# Patient Record
Sex: MALE | Race: WHITE
[De-identification: names, ages, dates, MRNs, and addresses within clinical notes are randomized per-mention and may not be internally consistent; named-entity substitution may affect disease eponyms.]

---

## 2022-09-26 ENCOUNTER — HOSPITAL ENCOUNTER (OUTPATIENT)
Dept: HOSPITAL 19 - MEDICAL | Age: 72
LOS: 1 days | Discharge: HOME | End: 2022-09-27
Attending: INTERNAL MEDICINE
Payer: MEDICARE

## 2022-09-26 VITALS — SYSTOLIC BLOOD PRESSURE: 145 MMHG | DIASTOLIC BLOOD PRESSURE: 85 MMHG | HEART RATE: 86 BPM

## 2022-09-26 VITALS — WEIGHT: 202.16 LBS | HEIGHT: 69.02 IN | BODY MASS INDEX: 29.94 KG/M2

## 2022-09-26 VITALS — DIASTOLIC BLOOD PRESSURE: 92 MMHG | TEMPERATURE: 98.5 F | SYSTOLIC BLOOD PRESSURE: 174 MMHG | HEART RATE: 80 BPM

## 2022-09-26 VITALS — SYSTOLIC BLOOD PRESSURE: 146 MMHG | HEART RATE: 91 BPM | DIASTOLIC BLOOD PRESSURE: 83 MMHG

## 2022-09-26 VITALS — DIASTOLIC BLOOD PRESSURE: 72 MMHG | SYSTOLIC BLOOD PRESSURE: 144 MMHG | HEART RATE: 74 BPM

## 2022-09-26 VITALS — SYSTOLIC BLOOD PRESSURE: 141 MMHG | DIASTOLIC BLOOD PRESSURE: 99 MMHG | HEART RATE: 89 BPM

## 2022-09-26 VITALS — SYSTOLIC BLOOD PRESSURE: 151 MMHG | HEART RATE: 82 BPM | DIASTOLIC BLOOD PRESSURE: 83 MMHG

## 2022-09-26 VITALS — DIASTOLIC BLOOD PRESSURE: 74 MMHG | TEMPERATURE: 98.5 F | HEART RATE: 87 BPM | SYSTOLIC BLOOD PRESSURE: 135 MMHG

## 2022-09-26 VITALS — SYSTOLIC BLOOD PRESSURE: 155 MMHG | HEART RATE: 85 BPM | DIASTOLIC BLOOD PRESSURE: 70 MMHG

## 2022-09-26 VITALS — DIASTOLIC BLOOD PRESSURE: 80 MMHG | HEART RATE: 86 BPM | SYSTOLIC BLOOD PRESSURE: 115 MMHG

## 2022-09-26 VITALS — TEMPERATURE: 97.8 F | DIASTOLIC BLOOD PRESSURE: 88 MMHG | HEART RATE: 83 BPM | SYSTOLIC BLOOD PRESSURE: 158 MMHG

## 2022-09-26 VITALS — HEART RATE: 89 BPM | DIASTOLIC BLOOD PRESSURE: 77 MMHG | SYSTOLIC BLOOD PRESSURE: 158 MMHG

## 2022-09-26 VITALS — HEART RATE: 88 BPM | SYSTOLIC BLOOD PRESSURE: 168 MMHG | DIASTOLIC BLOOD PRESSURE: 83 MMHG

## 2022-09-26 VITALS — HEART RATE: 87 BPM | SYSTOLIC BLOOD PRESSURE: 168 MMHG | DIASTOLIC BLOOD PRESSURE: 97 MMHG

## 2022-09-26 VITALS — DIASTOLIC BLOOD PRESSURE: 88 MMHG | SYSTOLIC BLOOD PRESSURE: 140 MMHG | HEART RATE: 93 BPM

## 2022-09-26 DIAGNOSIS — Z79.82: ICD-10-CM

## 2022-09-26 DIAGNOSIS — I73.9: ICD-10-CM

## 2022-09-26 DIAGNOSIS — I25.810: Primary | ICD-10-CM

## 2022-09-26 DIAGNOSIS — I50.20: ICD-10-CM

## 2022-09-26 DIAGNOSIS — Z79.02: ICD-10-CM

## 2022-09-26 LAB
ANION GAP SERPL CALC-SCNC: 11 MMOL/L (ref 7–16)
APTT PPP: 32.8 SECONDS (ref 26–37)
BASOPHILS # BLD: 0.1 K/MM3 (ref 0–0.2)
BASOPHILS NFR BLD AUTO: 0.5 % (ref 0–2)
BUN SERPL-MCNC: 31 MG/DL (ref 8–26)
CALCIUM SERPL-MCNC: 9.4 MG/DL (ref 8.4–10.2)
CHLORIDE SERPL-SCNC: 107 MMOL/L (ref 98–107)
CO2 SERPL-SCNC: 23 MMOL/L (ref 23–31)
CREAT SERPL-SCNC: 1.66 MG/DL (ref 0.72–1.25)
EOSINOPHIL # BLD: 0.3 K/MM3 (ref 0–0.7)
EOSINOPHIL NFR BLD: 2.7 % (ref 0–4)
ERYTHROCYTE [DISTWIDTH] IN BLOOD BY AUTOMATED COUNT: 12.6 % (ref 11.5–14.5)
ERYTHROCYTE [DISTWIDTH] IN BLOOD BY AUTOMATED COUNT: 12.6 % (ref 11.5–14.5)
GLUCOSE SERPL-MCNC: 110 MG/DL (ref 70–99)
GRANULOCYTES # BLD AUTO: 58.8 % (ref 42.2–75.2)
HCT VFR BLD AUTO: 43.9 % (ref 42–52)
HCT VFR BLD AUTO: 45.9 % (ref 42–52)
HGB BLD-MCNC: 14.9 G/DL (ref 13.5–18)
HGB BLD-MCNC: 15.8 G/DL (ref 13.5–18)
INR BLD: 1 (ref 0.8–3)
LYMPHOCYTES # BLD: 3 K/MM3 (ref 1.2–3.4)
LYMPHOCYTES NFR BLD: 28.2 % (ref 20–51)
MCH RBC QN AUTO: 30 PG (ref 27–31)
MCH RBC QN AUTO: 31 PG (ref 27–31)
MCHC RBC AUTO-ENTMCNC: 34 G/DL (ref 33–37)
MCHC RBC AUTO-ENTMCNC: 34 G/DL (ref 33–37)
MCV RBC AUTO: 89 FL (ref 80–100)
MCV RBC AUTO: 90 FL (ref 80–100)
MONOCYTES # BLD: 0.9 K/MM3 (ref 0.1–0.6)
MONOCYTES NFR BLD AUTO: 8.9 % (ref 1.7–9.3)
NEUTROPHILS # BLD: 6.2 K/MM3 (ref 1.4–6.5)
PLATELET # BLD AUTO: 209 K/MM3 (ref 130–400)
PLATELET # BLD AUTO: 225 K/MM3 (ref 130–400)
PMV BLD AUTO: 10.2 FL (ref 7.4–10.4)
PMV BLD AUTO: 10.3 FL (ref 7.4–10.4)
POTASSIUM SERPL-SCNC: 4.6 MMOL/L (ref 3.5–4.5)
PROTHROMBIN TIME: 11.1 SECONDS (ref 9.7–12.8)
RBC # BLD AUTO: 4.92 M/MM3 (ref 4.2–5.6)
RBC # BLD AUTO: 5.11 M/MM3 (ref 4.2–5.6)
SODIUM SERPL-SCNC: 141 MMOL/L (ref 136–145)

## 2022-09-26 PROCEDURE — C1760 CLOSURE DEV, VASC: HCPCS

## 2022-09-26 PROCEDURE — C1894 INTRO/SHEATH, NON-LASER: HCPCS

## 2022-09-26 PROCEDURE — C1725 CATH, TRANSLUMIN NON-LASER: HCPCS

## 2022-09-26 PROCEDURE — C1769 GUIDE WIRE: HCPCS

## 2022-09-26 PROCEDURE — C1874 STENT, COATED/COV W/DEL SYS: HCPCS

## 2022-09-26 PROCEDURE — C9604 PERC D-E COR REVASC T CABG S: HCPCS

## 2022-09-26 PROCEDURE — C1884 EMBOLIZATION PROTECT SYST: HCPCS

## 2022-09-26 PROCEDURE — C1887 CATHETER, GUIDING: HCPCS

## 2022-09-26 NOTE — NUR
CONTACTED EDIE ABOUT PTS HEMATOMA INCREASING IN SIZE, RECOMMENDED CBC AND TO
CONTACT [place]. [place] AGREED TO CBC AND TO TAKE FEMOSTOP OFF AND APPLY
ACE BANDAGE FROM BELOW PTS KNEE UP TO GROIN.

## 2022-09-26 NOTE — NUR
Patient has been unclear with medication list and names of medications.Pt
requested this nurse to [facility] pharmacy and request recent scripts filled.

## 2022-09-26 NOTE — NUR
PT A&OX4 RESTING IN BED. MEDS GIVEN AND ASSESSMENT COMPLETE. PT DENIES PN. LT
FEMORAL CATH INSERTION SITE CDI, SOFT HEMATOMA PRESENT. +2 PEDAL PULSES. LF
FOREARM INT PATENT. VSS AND TELE IN PLACE. RT BUTTOCK HAS AN ABCESS W A RED
DRAIN PLACED AND FISTULA. NO NEEDS AT THIS TIME. CALL LIGHT WITHIN REACH.

## 2022-09-26 NOTE — NUR
PT ADMITTED TO MEDICAL UNIT. ADMISSION INTAKE AND ASSESSMENT COMPLETED. MED
REC UPDATED BY GONZALO HUNT. PT COMPLETING POST OP VITALS, VSS. FEMOSTOP
COMPRESSION DEVICE IN PLACE. CONTINUING TO MONITOR.

## 2022-09-27 VITALS — SYSTOLIC BLOOD PRESSURE: 147 MMHG | HEART RATE: 73 BPM | TEMPERATURE: 98.3 F | DIASTOLIC BLOOD PRESSURE: 67 MMHG

## 2022-09-27 VITALS — DIASTOLIC BLOOD PRESSURE: 61 MMHG | HEART RATE: 71 BPM | TEMPERATURE: 98.2 F | SYSTOLIC BLOOD PRESSURE: 141 MMHG

## 2022-09-27 VITALS — TEMPERATURE: 98.2 F | HEART RATE: 71 BPM | DIASTOLIC BLOOD PRESSURE: 61 MMHG | SYSTOLIC BLOOD PRESSURE: 151 MMHG

## 2022-09-27 VITALS — HEART RATE: 83 BPM | TEMPERATURE: 98.3 F | DIASTOLIC BLOOD PRESSURE: 60 MMHG | SYSTOLIC BLOOD PRESSURE: 125 MMHG

## 2022-09-27 VITALS — SYSTOLIC BLOOD PRESSURE: 132 MMHG | DIASTOLIC BLOOD PRESSURE: 64 MMHG | HEART RATE: 75 BPM | TEMPERATURE: 98.6 F

## 2022-09-27 LAB
ANION GAP SERPL CALC-SCNC: 11 MMOL/L (ref 7–16)
BASOPHILS # BLD: 0.1 K/MM3 (ref 0–0.2)
BASOPHILS NFR BLD AUTO: 0.6 % (ref 0–2)
BUN SERPL-MCNC: 31 MG/DL (ref 8–26)
CALCIUM SERPL-MCNC: 8.8 MG/DL (ref 8.4–10.2)
CHLORIDE SERPL-SCNC: 103 MMOL/L (ref 98–107)
CK SERPL-CCNC: 49 U/L (ref 30–200)
CO2 SERPL-SCNC: 23 MMOL/L (ref 23–31)
CREAT SERPL-SCNC: 1.54 MG/DL (ref 0.72–1.25)
EOSINOPHIL # BLD: 0.4 K/MM3 (ref 0–0.7)
EOSINOPHIL NFR BLD: 3.9 % (ref 0–4)
ERYTHROCYTE [DISTWIDTH] IN BLOOD BY AUTOMATED COUNT: 12.6 % (ref 11.5–14.5)
GLUCOSE SERPL-MCNC: 104 MG/DL (ref 70–99)
GRANULOCYTES # BLD AUTO: 60.2 % (ref 42.2–75.2)
HCT VFR BLD AUTO: 40.2 % (ref 42–52)
HGB BLD-MCNC: 13.7 G/DL (ref 13.5–18)
LYMPHOCYTES # BLD: 2.5 K/MM3 (ref 1.2–3.4)
LYMPHOCYTES NFR BLD: 24.2 % (ref 20–51)
MCH RBC QN AUTO: 30 PG (ref 27–31)
MCHC RBC AUTO-ENTMCNC: 34 G/DL (ref 33–37)
MCV RBC AUTO: 89 FL (ref 80–100)
MONOCYTES # BLD: 1 K/MM3 (ref 0.1–0.6)
MONOCYTES NFR BLD AUTO: 9.7 % (ref 1.7–9.3)
NEUTROPHILS # BLD: 6.3 K/MM3 (ref 1.4–6.5)
PLATELET # BLD AUTO: 198 K/MM3 (ref 130–400)
PMV BLD AUTO: 10.3 FL (ref 7.4–10.4)
POTASSIUM SERPL-SCNC: 3.7 MMOL/L (ref 3.5–4.5)
RBC # BLD AUTO: 4.53 M/MM3 (ref 4.2–5.6)
SODIUM SERPL-SCNC: 137 MMOL/L (ref 136–145)

## 2022-09-27 NOTE — NUR
ACE WRAP TO LLE REWRAPPED BY THIS RN AND SANAM MORENO. CATH SITE DRESSING C/D/I
WITH GUAZE AND TEGADERM IN PLACE. LARGE HEMATOMA NOTED FROM L INNER GROIN,
SITE IS SOFT TO PALPATION. SOME BRUISING NOTED TO SITE. PT DENIES ANY PAIN.
ACE WRAP EXTENDS FROM HIP TO KNEE. WILL CONTINUE TO MONITOR.

## 2022-09-27 NOTE — NUR
AT 1345 PT CALLED TO INFORM THIS RN THAT HE WAS STARTING TO FEEL DIZZY AND
LIGHTHEADED. PT INFORMS ME THAT THIS HAPPENS WHEN HE TAKES HIS LOSARTAN IN THE
MORNING INSTEAD OF AT NIGHT. THIS RN OBTAINED A B/P AT 88/39. SANAM MORENO
CONTACTED AND INFORMED OF SITUATION. THIS RN WAS INSTRUCTED TO OBTAIN A REPEAT
PRESSURE WHEN PT'S RIDE ARRIVED FOR DISCHARGE.
 
AT 1610 PT CALLED AGAIN STATING HIS RIDE HAD ARRIVED. REPEAT PRESSURE WAS
OBTAINED /95. PT STATED HE WAS BEGINNING TO FEEL BETTER. PT EAGER TO
DISCHARGE. HEART CATH SITE CHECKED, A SMALL AMOUNT OF BLOOD WAS SATURATED TO
THE GUAZE DRESSING, NO NEW SIGNS OF SWELLING. SANAM MORENO CONTACTED AGAIN AND
HE INFORMED THIS RN THAT PT WAS OK TO DISCHARGE. PT ESCORTED DOWN TO VEHICLE
WITH PERSONAL BELONGINGS.

## 2022-09-27 NOTE — NUR
RE:CARDIAC REHAB.
Reviewed discharge instructions for femoral heart catheterization.
 Cleaning site with mild soap and water, pat dry.
Discussed monitoring for redness, hot to touch, inflammation, or temperature
>100.4. Discussed when to contact the physician for signs of symptoms of
complications or MI. Also reviewed risk factors for heart disease. Covered
applicable modifiable risk factors including the following: tobacco cessation,
HTN, hyperlipidemia, diabetes, overweight/obesity, sedentary lifestyle, and
stress/depression. Patient verbalized understanding. Referral sent to [facility] in [place], [place] [facility] with patient permission.
Patient is planned to have staged stents in 2 weeks.

## 2022-10-20 ENCOUNTER — HOSPITAL ENCOUNTER (OUTPATIENT)
Dept: HOSPITAL 19 - COL.CAR | Age: 72
LOS: 1 days | Discharge: HOME | End: 2022-10-21
Attending: INTERNAL MEDICINE
Payer: MEDICARE

## 2022-10-20 VITALS — TEMPERATURE: 98.1 F | HEART RATE: 86 BPM | SYSTOLIC BLOOD PRESSURE: 138 MMHG | DIASTOLIC BLOOD PRESSURE: 66 MMHG

## 2022-10-20 VITALS — DIASTOLIC BLOOD PRESSURE: 79 MMHG | HEART RATE: 70 BPM | TEMPERATURE: 98.3 F | SYSTOLIC BLOOD PRESSURE: 162 MMHG

## 2022-10-20 VITALS — SYSTOLIC BLOOD PRESSURE: 139 MMHG | HEART RATE: 71 BPM | DIASTOLIC BLOOD PRESSURE: 81 MMHG

## 2022-10-20 VITALS — HEART RATE: 72 BPM | SYSTOLIC BLOOD PRESSURE: 148 MMHG | DIASTOLIC BLOOD PRESSURE: 62 MMHG | TEMPERATURE: 97.8 F

## 2022-10-20 VITALS — SYSTOLIC BLOOD PRESSURE: 152 MMHG | HEART RATE: 73 BPM | DIASTOLIC BLOOD PRESSURE: 62 MMHG | TEMPERATURE: 98.1 F

## 2022-10-20 VITALS — DIASTOLIC BLOOD PRESSURE: 61 MMHG | SYSTOLIC BLOOD PRESSURE: 127 MMHG | HEART RATE: 86 BPM | TEMPERATURE: 98 F

## 2022-10-20 VITALS — DIASTOLIC BLOOD PRESSURE: 76 MMHG | SYSTOLIC BLOOD PRESSURE: 159 MMHG | HEART RATE: 71 BPM

## 2022-10-20 VITALS — TEMPERATURE: 97.8 F | HEART RATE: 71 BPM | SYSTOLIC BLOOD PRESSURE: 139 MMHG | DIASTOLIC BLOOD PRESSURE: 62 MMHG

## 2022-10-20 VITALS — DIASTOLIC BLOOD PRESSURE: 79 MMHG | SYSTOLIC BLOOD PRESSURE: 144 MMHG | HEART RATE: 72 BPM

## 2022-10-20 VITALS — HEART RATE: 73 BPM | SYSTOLIC BLOOD PRESSURE: 134 MMHG | DIASTOLIC BLOOD PRESSURE: 72 MMHG

## 2022-10-20 VITALS — HEART RATE: 73 BPM | SYSTOLIC BLOOD PRESSURE: 135 MMHG | DIASTOLIC BLOOD PRESSURE: 57 MMHG

## 2022-10-20 VITALS — BODY MASS INDEX: 30.56 KG/M2 | WEIGHT: 206.35 LBS | HEIGHT: 69.02 IN

## 2022-10-20 VITALS — SYSTOLIC BLOOD PRESSURE: 145 MMHG | DIASTOLIC BLOOD PRESSURE: 67 MMHG | HEART RATE: 70 BPM

## 2022-10-20 VITALS — DIASTOLIC BLOOD PRESSURE: 88 MMHG | HEART RATE: 66 BPM | SYSTOLIC BLOOD PRESSURE: 126 MMHG | TEMPERATURE: 97.8 F

## 2022-10-20 VITALS — DIASTOLIC BLOOD PRESSURE: 77 MMHG | SYSTOLIC BLOOD PRESSURE: 140 MMHG | HEART RATE: 76 BPM

## 2022-10-20 DIAGNOSIS — I25.810: Primary | ICD-10-CM

## 2022-10-20 LAB
ANION GAP SERPL CALC-SCNC: 9 MMOL/L (ref 7–16)
APTT PPP: 35.5 SECONDS (ref 26–37)
BUN SERPL-MCNC: 37 MG/DL (ref 8–26)
CALCIUM SERPL-MCNC: 10.2 MG/DL (ref 8.4–10.2)
CHLORIDE SERPL-SCNC: 103 MMOL/L (ref 98–107)
CO2 SERPL-SCNC: 26 MMOL/L (ref 23–31)
CREAT SERPL-SCNC: 1.85 MG/DL (ref 0.72–1.25)
ERYTHROCYTE [DISTWIDTH] IN BLOOD BY AUTOMATED COUNT: 12.6 % (ref 11.5–14.5)
GLUCOSE SERPL-MCNC: 170 MG/DL (ref 70–99)
HCT VFR BLD AUTO: 42.4 % (ref 42–52)
HGB BLD-MCNC: 14.4 G/DL (ref 13.5–18)
INR BLD: 1 (ref 0.8–3)
MCH RBC QN AUTO: 30 PG (ref 27–31)
MCHC RBC AUTO-ENTMCNC: 34 G/DL (ref 33–37)
MCV RBC AUTO: 90 FL (ref 80–100)
PLATELET # BLD AUTO: 227 K/MM3 (ref 130–400)
PMV BLD AUTO: 9.8 FL (ref 7.4–10.4)
POTASSIUM SERPL-SCNC: 4.6 MMOL/L (ref 3.5–4.5)
PROTHROMBIN TIME: 11 SECONDS (ref 9.7–12.8)
RBC # BLD AUTO: 4.74 M/MM3 (ref 4.2–5.6)
SODIUM SERPL-SCNC: 138 MMOL/L (ref 136–145)

## 2022-10-20 PROCEDURE — C9600 PERC DRUG-EL COR STENT SING: HCPCS

## 2022-10-20 NOTE — NUR
PATIENT ADMITTED TO ROOM 308 VIA BED FROM CATH LAB ACCOMPANIED BY CATH LAB RN
X2. DENIES ANY PAIN. ALERT AND ORIENTED X4. DRESSING TO FEMORAL SITE CLEAN,
DRY, AND INTACT WITH NO SIGNS OF HEMATOMA TO THE AREA. PATIENT SUPINE AT THIS
TIME, EDUCATED ON THE IMPORTANCE OF REMAINING SUPINE 6 HOURS POST CATH, PT
VERBALIZED UNDERSTANDING. GLASSES AND PHONE AT BEDSIDE. MED REC COMPLETED WITH
PATIENT. DENIES ANY NEEDS AT THIS TIME. VOIDING PER URINAL. CALL LIGHT WITHIN
REACH, BED IN LOWEST, LOCKED POSITION. UNABLE TO ASSESS POSTERIOR DUE TO NEED
TO LAY FLAT, HOWEVER, PATIENT STATES HE HAS A FISTULA TO HIS RECTUM. DENIES
ANY OTHER SKIN CONCERNS.

## 2022-10-21 VITALS — TEMPERATURE: 97.9 F | SYSTOLIC BLOOD PRESSURE: 114 MMHG | HEART RATE: 72 BPM | DIASTOLIC BLOOD PRESSURE: 52 MMHG

## 2022-10-21 VITALS — DIASTOLIC BLOOD PRESSURE: 52 MMHG | SYSTOLIC BLOOD PRESSURE: 114 MMHG | TEMPERATURE: 97.9 F | HEART RATE: 72 BPM

## 2022-10-21 VITALS — TEMPERATURE: 98.5 F | DIASTOLIC BLOOD PRESSURE: 79 MMHG | HEART RATE: 69 BPM | SYSTOLIC BLOOD PRESSURE: 124 MMHG

## 2022-10-21 VITALS — SYSTOLIC BLOOD PRESSURE: 124 MMHG | DIASTOLIC BLOOD PRESSURE: 45 MMHG | HEART RATE: 65 BPM | TEMPERATURE: 97.5 F

## 2022-10-21 VITALS — HEART RATE: 87 BPM | SYSTOLIC BLOOD PRESSURE: 142 MMHG | TEMPERATURE: 97.7 F | DIASTOLIC BLOOD PRESSURE: 55 MMHG

## 2022-10-21 VITALS — TEMPERATURE: 98.5 F | HEART RATE: 69 BPM | SYSTOLIC BLOOD PRESSURE: 124 MMHG | DIASTOLIC BLOOD PRESSURE: 79 MMHG

## 2022-10-21 NOTE — NUR
Femoral site assessed, dressing CDI, site soft and free from hematoma.
Restrictions reviewed. All questions reviewed.

## 2022-10-21 NOTE — NUR
PATIENT IS STABLE. HAS NOT C/O PAIN THIS SHIFT. PATIENT STARTED NPO AT
MIDNIGHT FOR ANDREIA AND CARDIOVERSION PROCEDURE. CONSENT SIGNED. PATIENT
ACKNOWLEDGED UNDERSTANDING. VSS

## 2022-10-21 NOTE — NUR
Initial visit; Patient thanked  for looking in on him and offering
encouragement and God's blessings.

## 2022-10-21 NOTE — NUR
Pt asleep and laying in bed. Morning medications administered per eMAR. Shift
assessment completed. R femoral heart cath site CDI; gauze and tegaderm
dressing in place. Radial and pedal pulses noted. INT in R AC intact; no edema
or redness. Pt denies having any pain at this time. Call light within reach.

## 2022-10-21 NOTE — NUR
met with patient to discuss discharge planning. Patient is up
and eating breakfast. Patient lives alone in [place] and sees Dr. Davidson in
[place] for primary care. Patient obtains medications from [facility]
with no difficulties. Patient does not use any DME and is independent with
ADLS. Patient stated his sister, Ashley (ph#[phone]) is his DPOA-HC
although she lives out of state. Patient plans to return home at time of
discharge.
 
Discharge Plan: Home

## 2024-12-02 ENCOUNTER — HOSPITAL ENCOUNTER (OUTPATIENT)
Dept: HOSPITAL 19 - MHCPAIN | Age: 74
End: 2024-12-02
Attending: ANESTHESIOLOGY
Payer: MEDICAID

## 2024-12-02 DIAGNOSIS — M54.50: ICD-10-CM

## 2024-12-02 DIAGNOSIS — M47.817: Primary | ICD-10-CM
